# Patient Record
Sex: FEMALE | Race: WHITE | NOT HISPANIC OR LATINO | Employment: STUDENT | URBAN - METROPOLITAN AREA
[De-identification: names, ages, dates, MRNs, and addresses within clinical notes are randomized per-mention and may not be internally consistent; named-entity substitution may affect disease eponyms.]

---
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
## 2023-08-29 NOTE — PROGRESS NOTES
OB follow up     Aylin Perry is a 14 y.o.  33w0d being seen today for her obstetrical visit.  Patient reports no bleeding, no contractions and no leaking. Fetal movement: normal.  Prenatal care complicated by teenage pregnancy, baby up for adoption and heartburn.  She takes Pepcid daily.  It controls her heartburn.    Review of Systems  No bleeding, No cramping/contractions     /77   Wt 67.5 kg (148 lb 12.8 oz)   LMP 2020 (LMP Unknown)     FHT: present BPM   Uterine Size: 33 cm       Assessment/Plan:    1) 14 y.o.  -pregnancy at 33w0d    2)   Encounter Diagnoses   Name Primary?   • Pregnancy with adoption planned, antepartum: pt considering. Yes   • Late prenatal care: Care starting at 30 weeks    • Anemia of pregnancy, unspecified trimester    • Heartburn    Continue oral Pepcid daily.    3) Reviewed this stage of pregnancy  4) Problem list updated     Return in about 2 weeks (around 2020) for OB Tummy.      Baron Sexton MD    2020  10:13   Until cleared by Dr. Nava.